# Patient Record
Sex: MALE | URBAN - METROPOLITAN AREA
[De-identification: names, ages, dates, MRNs, and addresses within clinical notes are randomized per-mention and may not be internally consistent; named-entity substitution may affect disease eponyms.]

---

## 2022-07-14 ENCOUNTER — OFFICE VISIT (OUTPATIENT)
Dept: FAMILY MEDICINE CLINIC | Facility: CLINIC | Age: 37
End: 2022-07-14
Payer: COMMERCIAL

## 2022-07-14 VITALS
OXYGEN SATURATION: 97 % | TEMPERATURE: 97 F | HEIGHT: 68.35 IN | RESPIRATION RATE: 16 BRPM | HEART RATE: 57 BPM | DIASTOLIC BLOOD PRESSURE: 68 MMHG | WEIGHT: 179 LBS | BODY MASS INDEX: 26.82 KG/M2 | SYSTOLIC BLOOD PRESSURE: 110 MMHG

## 2022-07-14 DIAGNOSIS — Z00.00 ENCOUNTER FOR ANNUAL PHYSICAL EXAM: Primary | ICD-10-CM

## 2022-07-14 PROCEDURE — 3074F SYST BP LT 130 MM HG: CPT | Performed by: FAMILY MEDICINE

## 2022-07-14 PROCEDURE — 3078F DIAST BP <80 MM HG: CPT | Performed by: FAMILY MEDICINE

## 2022-07-14 PROCEDURE — 3008F BODY MASS INDEX DOCD: CPT | Performed by: FAMILY MEDICINE

## 2022-07-14 PROCEDURE — 99385 PREV VISIT NEW AGE 18-39: CPT | Performed by: FAMILY MEDICINE

## 2024-05-06 ENCOUNTER — OFFICE VISIT (OUTPATIENT)
Dept: FAMILY MEDICINE CLINIC | Facility: CLINIC | Age: 39
End: 2024-05-06
Payer: COMMERCIAL

## 2024-05-06 VITALS
SYSTOLIC BLOOD PRESSURE: 116 MMHG | HEIGHT: 68.35 IN | TEMPERATURE: 97 F | HEART RATE: 57 BPM | BODY MASS INDEX: 25.92 KG/M2 | OXYGEN SATURATION: 99 % | DIASTOLIC BLOOD PRESSURE: 70 MMHG | RESPIRATION RATE: 16 BRPM | WEIGHT: 173 LBS

## 2024-05-06 DIAGNOSIS — Z00.00 ENCOUNTER FOR ANNUAL PHYSICAL EXAM: Primary | ICD-10-CM

## 2024-05-06 DIAGNOSIS — R19.7 DIARRHEA, UNSPECIFIED TYPE: ICD-10-CM

## 2024-05-06 DIAGNOSIS — Z80.0 FAMILY HISTORY OF COLON CANCER: ICD-10-CM

## 2024-05-06 DIAGNOSIS — T78.40XA ALLERGY, INITIAL ENCOUNTER: ICD-10-CM

## 2024-05-06 PROCEDURE — 99213 OFFICE O/P EST LOW 20 MIN: CPT | Performed by: FAMILY MEDICINE

## 2024-05-06 PROCEDURE — 99396 PREV VISIT EST AGE 40-64: CPT | Performed by: FAMILY MEDICINE

## 2024-05-06 PROCEDURE — 3008F BODY MASS INDEX DOCD: CPT | Performed by: FAMILY MEDICINE

## 2024-05-06 PROCEDURE — 3078F DIAST BP <80 MM HG: CPT | Performed by: FAMILY MEDICINE

## 2024-05-06 PROCEDURE — 3074F SYST BP LT 130 MM HG: CPT | Performed by: FAMILY MEDICINE

## 2024-05-06 NOTE — PROGRESS NOTES
/70   Pulse 57   Temp 97.2 °F (36.2 °C) (Temporal)   Resp 16   Ht 5' 8.35\" (1.736 m)   Wt 173 lb (78.5 kg)   SpO2 99%   BMI 26.04 kg/m²  Body mass index is 26.04 kg/m².     Chief Complaint   Patient presents with    Physical       Doug Sun is a 39 year old male who presents for a complete physical exam.   HPI:   Patient been seen here after 1-1/2-year  He is here today for physical  He states that he has been struggling with recurrent diarrheas for some time and has seen gastroenterology who did several test unfortunately could not find the cause of his recurrent diarrhea  There is no significant weight loss or weight gain  Denies any abdominal pain nausea or vomiting    Also been complaining of dull ache above the left orbit, that he noticed for the last 7 to 10 days  Patient had his eyes checked about a year ago  He states that his vision has gone worse on the left  He has not taken any medications        Wt Readings from Last 4 Encounters:   05/06/24 173 lb (78.5 kg)   07/14/22 179 lb (81.2 kg)     Body mass index is 26.04 kg/m².   BP Readings from Last 3 Encounters:   05/06/24 116/70   07/14/22 110/68      No current outpatient medications on file.      Past Medical History:    Allergic rhinitis    Depression      Past Surgical History:   Procedure Laterality Date    Endosc balloon sinuplasty  2014      Family History   Problem Relation Age of Onset    Depression Father     Hypertension Father     Depression Mother       Social History:  Social History     Tobacco Use    Smoking status: Former     Types: Cigarettes    Smokeless tobacco: Never   Vaping Use    Vaping status: Never Used   Substance and Sexual Activity    Alcohol use: Never    Drug use: Yes     Frequency: 7.0 times per week     Types: Cannabis      Exercise: none.  Diet: doesn't watch     REVIEW OF SYSTEMS:   GENERAL HEALTH: feels well otherwise, denies fever  SKIN: denies any unusual skin lesions or rashes  EYES: no visual  complaints or deficits  HEENT: denies nasal congestion, sinus pain or sore throat; hearing loss negative  RESPIRATORY: denies shortness of breath, wheezing or cough  CARDIOVASCULAR: denies chest pain or WORRELL; no palpitations  GI: denies nausea, vomiting, constipation, diarrhea; no rectal bleeding; no heartburn  MUSCULOSKELETAL: no joint complaints upper or lower extremities  NEURO: no sensory or motor complaint  PSYCHE: no symptoms of depression or anxiety  HEMATOLOGY: denies h/o anemia; denies bruising or excessive bleeding  ENDOCRINE: denies excessive thirst or urination; denies unexpected wt gain or wt loss  ALLERGY/IMM.: denies food or seasonal allergies    EXAM:   /70   Pulse 57   Temp 97.2 °F (36.2 °C) (Temporal)   Resp 16   Ht 5' 8.35\" (1.736 m)   Wt 173 lb (78.5 kg)   SpO2 99%   BMI 26.04 kg/m²  Body mass index is 26.04 kg/m².   GENERAL: well developed, well nourished,in no apparent distress  SKIN: no rashes,no suspicious lesions  HEENT: atraumatic, normocephalic,  Ears have mild bilateral serous effusion  EYES:PERRLA, EOMI,conjunctiva are clear  NECK: supple,no adenopathy,no bruits  CHEST: no chest tenderness  LUNGS: clear to auscultation  CARDIO: RRR without murmur  GI: good BS's,no masses, HSM or tenderness  : Deferred  RECTAL:Deferred  MUSCULOSKELETAL: back is not tender,FROM of the back  EXTREMITIES: no cyanosis, clubbing or edema  NEURO: Oriented times three,cranial nerves are intact,motor and sensory are grossly intact    ASSESSMENT AND PLAN:   :Doug was seen today for physical.    Diagnoses and all orders for this visit:    Encounter for annual physical examOmar Corinne is a 39 year old male who presents for a complete physical exam.   Pt's weight is Body mass index is 26.04 kg/m².,   Exercise regularly --- Dietary measures discussed include diet about 1800 calories - Excercise regimen also reviewed as well as long term benefits on overall health.   Recommend at least 30 minutes a day 3-4  times a week of aerobic activity such as brisk walking, cycling, aerobics, or swimming.  Anerobic activities also encouraged for overall toning and strength/endurance building  Fasting labs ordered  Immunizations reviewed  Depression screen completed  Diet and exercise counseling given  Reviewed his family history  -     CBC With Differential With Platelet; Future  -     Comp Metabolic Panel (14); Future  -     Lipid Panel; Future  -     TSH and Free T4; Future    Diarrhea, unspecified type  We discussed the differential diagnosis that includes irritable bowel syndrome but given the family history of colon cancer patient may need a colonoscopy  His previous gastroenterologist has moved quite far from here and patient is preferring somebody new close to where he lives  Referral given to see Dr. Murphy-     Gastro Referral - In Network    Family history of colon cancer  -     Gastro Referral - In Network    Allergy, initial encounter  Advised over-the-counter Claritin once a day for at least 2 to 3 weeks  Tylenol for occasional headache  I also advised him to go back to see optometrist  Maybe his number has changed        The patient indicates understanding of these issues and agrees to the plan.  .      No follow-ups on file.        Kd Aaron MD, 5/6/2024, 12:23 PM      This dictation was performed with a verbal recognition program (DRAGON) and it was checked for errors. It is possible that there are still dictated errors within this office note. If so, please bring any errors to my attention for an addendum. All efforts were made to ensure that this office note is accurate

## 2024-05-10 ENCOUNTER — LAB ENCOUNTER (OUTPATIENT)
Dept: LAB | Age: 39
End: 2024-05-10
Attending: INTERNAL MEDICINE

## 2024-05-10 DIAGNOSIS — K52.9 CHRONIC DIARRHEA: ICD-10-CM

## 2024-05-10 DIAGNOSIS — Z00.00 ENCOUNTER FOR ANNUAL PHYSICAL EXAM: ICD-10-CM

## 2024-05-10 LAB
ALBUMIN SERPL-MCNC: 4.1 G/DL (ref 3.4–5)
ALBUMIN/GLOB SERPL: 1.3 {RATIO} (ref 1–2)
ALP LIVER SERPL-CCNC: 55 U/L
ALT SERPL-CCNC: 21 U/L
ANION GAP SERPL CALC-SCNC: 4 MMOL/L (ref 0–18)
AST SERPL-CCNC: 13 U/L (ref 15–37)
BASOPHILS # BLD AUTO: 0.05 X10(3) UL (ref 0–0.2)
BASOPHILS NFR BLD AUTO: 0.8 %
BILIRUB SERPL-MCNC: 0.5 MG/DL (ref 0.1–2)
BUN BLD-MCNC: 14 MG/DL (ref 9–23)
CALCIUM BLD-MCNC: 9 MG/DL (ref 8.5–10.1)
CHLORIDE SERPL-SCNC: 107 MMOL/L (ref 98–112)
CHOLEST SERPL-MCNC: 176 MG/DL (ref ?–200)
CO2 SERPL-SCNC: 29 MMOL/L (ref 21–32)
CREAT BLD-MCNC: 1.09 MG/DL
EGFRCR SERPLBLD CKD-EPI 2021: 88 ML/MIN/1.73M2 (ref 60–?)
EOSINOPHIL # BLD AUTO: 0.23 X10(3) UL (ref 0–0.7)
EOSINOPHIL NFR BLD AUTO: 3.8 %
ERYTHROCYTE [DISTWIDTH] IN BLOOD BY AUTOMATED COUNT: 13.1 %
FASTING PATIENT LIPID ANSWER: YES
FASTING STATUS PATIENT QL REPORTED: YES
GLOBULIN PLAS-MCNC: 3.1 G/DL (ref 2.8–4.4)
GLUCOSE BLD-MCNC: 89 MG/DL (ref 70–99)
HCT VFR BLD AUTO: 42.8 %
HDLC SERPL-MCNC: 65 MG/DL (ref 40–59)
HGB BLD-MCNC: 15 G/DL
IMM GRANULOCYTES # BLD AUTO: 0.01 X10(3) UL (ref 0–1)
IMM GRANULOCYTES NFR BLD: 0.2 %
LDLC SERPL CALC-MCNC: 104 MG/DL (ref ?–100)
LYMPHOCYTES # BLD AUTO: 1.71 X10(3) UL (ref 1–4)
LYMPHOCYTES NFR BLD AUTO: 28 %
MCH RBC QN AUTO: 29.3 PG (ref 26–34)
MCHC RBC AUTO-ENTMCNC: 35 G/DL (ref 31–37)
MCV RBC AUTO: 83.6 FL
MONOCYTES # BLD AUTO: 0.58 X10(3) UL (ref 0.1–1)
MONOCYTES NFR BLD AUTO: 9.5 %
NEUTROPHILS # BLD AUTO: 3.53 X10 (3) UL (ref 1.5–7.7)
NEUTROPHILS # BLD AUTO: 3.53 X10(3) UL (ref 1.5–7.7)
NEUTROPHILS NFR BLD AUTO: 57.7 %
NONHDLC SERPL-MCNC: 111 MG/DL (ref ?–130)
OSMOLALITY SERPL CALC.SUM OF ELEC: 290 MOSM/KG (ref 275–295)
PLATELET # BLD AUTO: 252 10(3)UL (ref 150–450)
POTASSIUM SERPL-SCNC: 4.1 MMOL/L (ref 3.5–5.1)
PROT SERPL-MCNC: 7.2 G/DL (ref 6.4–8.2)
RBC # BLD AUTO: 5.12 X10(6)UL
SODIUM SERPL-SCNC: 140 MMOL/L (ref 136–145)
T4 FREE SERPL-MCNC: 1.2 NG/DL (ref 0.8–1.7)
TRIGL SERPL-MCNC: 32 MG/DL (ref 30–149)
TSI SER-ACNC: 0.62 MIU/ML (ref 0.36–3.74)
VLDLC SERPL CALC-MCNC: 5 MG/DL (ref 0–30)
WBC # BLD AUTO: 6.1 X10(3) UL (ref 4–11)

## 2024-05-10 PROCEDURE — 85025 COMPLETE CBC W/AUTO DIFF WBC: CPT

## 2024-05-10 PROCEDURE — 36415 COLL VENOUS BLD VENIPUNCTURE: CPT

## 2024-05-10 PROCEDURE — 80053 COMPREHEN METABOLIC PANEL: CPT

## 2024-05-10 PROCEDURE — 82656 EL-1 FECAL QUAL/SEMIQ: CPT

## 2024-05-10 PROCEDURE — 84439 ASSAY OF FREE THYROXINE: CPT

## 2024-05-10 PROCEDURE — 87493 C DIFF AMPLIFIED PROBE: CPT

## 2024-05-10 PROCEDURE — 80061 LIPID PANEL: CPT

## 2024-05-10 PROCEDURE — 84443 ASSAY THYROID STIM HORMONE: CPT

## 2024-05-11 LAB — C DIFF TOX B STL QL: NEGATIVE

## 2024-05-13 NOTE — PROGRESS NOTES
Please notify the patient of normal  Results except for borderline high LDL  Rest of your labs are in acceptable limits  I am waiting for one of the test stool pancreatic elastase results  We will reported separately

## 2024-05-17 LAB — PANCREATIC ELAST FECAL: 396 UG ELAST./G

## 2024-05-30 PROBLEM — K59.1 FUNCTIONAL DIARRHEA: Status: ACTIVE | Noted: 2024-05-30

## 2024-05-30 PROBLEM — R19.7 DIARRHEA, UNSPECIFIED: Status: ACTIVE | Noted: 2024-05-30

## 2025-04-16 ENCOUNTER — OFFICE VISIT (OUTPATIENT)
Dept: FAMILY MEDICINE CLINIC | Facility: CLINIC | Age: 40
End: 2025-04-16
Payer: COMMERCIAL

## 2025-04-16 VITALS
RESPIRATION RATE: 16 BRPM | HEIGHT: 68 IN | WEIGHT: 169.13 LBS | DIASTOLIC BLOOD PRESSURE: 80 MMHG | TEMPERATURE: 98 F | BODY MASS INDEX: 25.63 KG/M2 | OXYGEN SATURATION: 98 % | SYSTOLIC BLOOD PRESSURE: 120 MMHG | HEART RATE: 76 BPM

## 2025-04-16 DIAGNOSIS — J02.9 SORE THROAT: Primary | ICD-10-CM

## 2025-04-16 DIAGNOSIS — Z00.00 BLOOD TESTS FOR ROUTINE GENERAL PHYSICAL EXAMINATION: ICD-10-CM

## 2025-04-16 PROCEDURE — 3079F DIAST BP 80-89 MM HG: CPT | Performed by: FAMILY MEDICINE

## 2025-04-16 PROCEDURE — 99213 OFFICE O/P EST LOW 20 MIN: CPT | Performed by: FAMILY MEDICINE

## 2025-04-16 PROCEDURE — 3008F BODY MASS INDEX DOCD: CPT | Performed by: FAMILY MEDICINE

## 2025-04-16 PROCEDURE — 3074F SYST BP LT 130 MM HG: CPT | Performed by: FAMILY MEDICINE

## 2025-04-16 RX ORDER — FLUTICASONE PROPIONATE 50 MCG
2 SPRAY, SUSPENSION (ML) NASAL DAILY
Qty: 1 EACH | Refills: 0 | Status: SHIPPED | OUTPATIENT
Start: 2025-04-16

## 2025-04-16 RX ORDER — FEXOFENADINE HCL 180 MG/1
180 TABLET ORAL DAILY
Qty: 30 TABLET | Refills: 11 | Status: SHIPPED | OUTPATIENT
Start: 2025-04-16 | End: 2026-04-16

## 2025-04-16 NOTE — PATIENT INSTRUCTIONS
VISIT SUMMARY:    You came in today because of a sore throat that has been bothering you for about a week. We discussed your symptoms and possible causes, and I provided a treatment plan to help you feel better.    YOUR PLAN:    -SORE THROAT WITH POSTNASAL DRIP: Your sore throat is likely due to allergic rhinitis, which is inflammation in your nose caused by allergies. This is not a bacterial infection. To reduce inflammation and mucus, I recommend increasing your fluid intake, doing steam inhalation, and using a sinus rinse or neti pot. I have prescribed Allegra (fexofenadine) to take daily for 2-3 weeks and Flonase (fluticasone) nasal spray. If your mucus is thick, you can take over-the-counter Mucinex (guaifenesin), but stop if your cough gets worse.    -GENERAL HEALTH MAINTENANCE: We discussed your upcoming physical and routine health maintenance. Your previous lab results were normal. You are due for a COVID-19 vaccine, which you can get at any pharmacy if you are not up to date. I have ordered blood tests for your physical, including a complete blood count, chemistry panel, cholesterol, and thyroid function tests.    INSTRUCTIONS:    Please follow the treatment plan for your sore throat and allergies. Make sure to get your COVID-19 vaccine if you haven't already. We will follow up with the results of your blood tests after your physical.

## 2025-04-16 NOTE — PROGRESS NOTES
Subjective:   Doug Sun is a 40 year old male who presents for Sore Throat (The following individual(s) verbally consented to be recorded using ambient AI listening technology and understand that they can each withdraw their consent to this listening technology at any point by asking the clinician to turn off or pause the recording:/Patient name: Doug Sun //  ) and Cough       History/Other:   History of Present Illness  Doug Sun is a 40-year-old male who presents with a sore throat persisting for about a week.    He has experienced a sore throat for approximately one week, which has not improved over time. The pain worsens throughout the day and is present when swallowing. Initially, he thought it might be due to postnasal drip.    He experienced a low-grade fever of about 99°F on Friday, which resolved after taking a day off work and resting. His son had an illness the previous week that lasted about three days, and he is uncertain if his symptoms are related.    He has a cough that produces mucus, although he has not checked the color of the mucus. No sensation of being underwater when moving his head, but he acknowledges the presence of fluid.    He is not currently taking any prescription medications.   Chief Complaint Reviewed and Verified  Nursing Notes Reviewed and   Verified  Tobacco Reviewed  Allergies Reviewed  Medications Reviewed    Problem List Reviewed  Medical History Reviewed  Surgical History   Reviewed  Family History Reviewed         Tobacco:  He smoked tobacco in the past but quit greater than 12 months ago.  Tobacco Use[1]     Current Medications[2]    PHQ-2 SCORE: 0  , done 4/16/2025          Review of Systems:  Pertinent items are noted in HPI.      Objective:   /80   Pulse 76   Temp 97.8 °F (36.6 °C) (Temporal)   Resp 16   Ht 5' 8\" (1.727 m)   Wt 169 lb 2 oz (76.7 kg)   SpO2 98%   BMI 25.72 kg/m²  Estimated body mass index is 25.72 kg/m² as calculated from the  following:    Height as of this encounter: 5' 8\" (1.727 m).    Weight as of this encounter: 169 lb 2 oz (76.7 kg).  Results       Physical Exam  Alert awake oriented x 3  HEENT: Throat with significant swelling.   Uvula swollen.  Bilateral serous effusion in the ears  Chest clear to auscultation  Heart-regular rate rhythm  Abdomen soft nontender nondistended  Extremities: No pedal edema cyanosis or clubbing          Assessment & Plan:   1. Sore throat (Primary)  Sore throat with postnasal drip  Sore throat likely due to allergic rhinitis, not bacterial infection. Discussed allergy treatment to reduce inflammation and mucus, with expected relief in three days.   Risks of untreated allergies include persistent symptoms. Discussed alternatives like neti pot and sinus rinse.  - Advise increased fluid intake.  - Recommend steam inhalation.  - Prescribe Allegra (fexofenadine) daily for 2-3 weeks.  - Prescribe Flonase (fluticasone) nasal spray.  - Suggest over-the-counter Mucinex (guaifenesin) if mucus is thick, discontinue if cough worsens.  - Recommend sinus rinse or neti pot-       Fexofenadine HCl; Take 1 tablet (180 mg total) by mouth daily.  Dispense: 30 tablet; Refill: 11  -     Fluticasone Propionate; 2 sprays by Each Nare route daily.  Dispense: 1 each; Refill: 0  2. Blood tests for routine general physical examination  -     CBC With Differential With Platelet; Future; Expected date: 04/16/2025  -     Comp Metabolic Panel (14); Future; Expected date: 04/16/2025  -     Lipid Panel; Future; Expected date: 04/16/2025  -     TSH and Free T4; Future; Expected date: 04/16/2025    General Health Maintenance  Discussed upcoming physical and routine health maintenance. Previous labs normal. COVID-19 vaccine due, HPV not recommended at this age.  - Order CBC, chemistry, cholesterol, and thyroid labs for physical.  - Advise COVID-19 vaccination at any pharmacy if not up to date.        No follow-ups on file.        Kd  MD Agnieszka, 2025, 9:22 AM              [1]   Social History  Tobacco Use   Smoking Status Former    Current packs/day: 0.00    Average packs/day: 0.3 packs/day for 10.0 years (2.5 ttl pk-yrs)    Types: Cigarettes    Quit date: 2012    Years since quittin.2   Smokeless Tobacco Never   [2]   Current Outpatient Medications   Medication Sig Dispense Refill    fexofenadine (ALLEGRA ALLERGY) 180 MG Oral Tab Take 1 tablet (180 mg total) by mouth daily. 30 tablet 11    fluticasone propionate 50 MCG/ACT Nasal Suspension 2 sprays by Each Nare route daily. 1 each 0    PEG 3350-KCl-Na Bicarb-NaCl 420 g Oral Recon Soln Take as directed by physician 4000 mL 0

## 2025-05-23 ENCOUNTER — OFFICE VISIT (OUTPATIENT)
Facility: LOCATION | Age: 40
End: 2025-05-23
Payer: COMMERCIAL

## 2025-05-23 DIAGNOSIS — K21.9 LARYNGOPHARYNGEAL REFLUX (LPR): Primary | ICD-10-CM

## 2025-05-23 DIAGNOSIS — R13.10 ODYNOPHAGIA: ICD-10-CM

## 2025-05-23 RX ORDER — PANTOPRAZOLE SODIUM 40 MG/1
40 TABLET, DELAYED RELEASE ORAL DAILY
Qty: 30 TABLET | Refills: 3 | Status: SHIPPED | OUTPATIENT
Start: 2025-05-23

## 2025-05-23 NOTE — PROGRESS NOTES
The following individual(s) verbally consented to be recorded using ambient AI listening technology and understand that they can each withdraw their consent to this listening technology at any point by asking the clinician to turn off or pause the recording: yes patient consents     Patient name: Doug Sun

## 2025-05-23 NOTE — PROGRESS NOTES
[facility]  OTOLARYNGOLOGY - HEAD & NECK SURGERY    5/23/2025     Reason for Consultation:     Chief Complaint   Patient presents with    Throat Problem     Patient is here due to recurrent sore throat, reports slight issues swallowing.          History of Present Illness:     History of Present Illness  Doug Sun is a 40 year old male who presents with intermittent sore throat and left-sided throat pain. He was referred by his primary care doctor for evaluation of his throat pain and sinus issues.    He has been experiencing intermittent sore throat with periods of intense pain for several months, initially noticed last year. The pain is localized to the left side of his throat and worsens throughout the day, particularly when swallowing liquids, especially warm ones. He describes the sensation as painful and notes that it is not reproducible to touch, occurring only internally.    He denies consistent acid reflux but has experienced a sour taste and dryness in his mouth upon waking. He has a history of sinus issues and underwent balloon sinuplasty in 2012 due to recurrent sinus infections occurring twice a year. He also reports ringing in his left ear for the past eight to nine years, which was evaluated by previous ENTs who ruled out hearing loss or tumors.    He experiences a random, intermittent dry cough and occasional snoring, as noted by his wife. No symptoms typically associated with allergies, such as itchy eyes or nose, and he does not believe his sore throat is related to postnasal drip, as it worsens throughout the day rather than being bad in the morning. He has not noticed any correlation between his symptoms and acid reflux, although he does eat late at night.    Past Medical History  Past Medical History[1]    Past Surgical History  Past Surgical History[2]    Family History  Family History[3]    Social History  Pediatric History   Patient Parents    Not on file     Other Topics Concern    Caffeine  Concern Not Asked    Exercise Not Asked    Seat Belt Not Asked    Special Diet Not Asked    Stress Concern Not Asked    Weight Concern Not Asked   Social History Narrative    Not on file           Current Medications:  Current Medications[4]    Allergies  Allergies[5]    Review of Systems:     A comprehensive 10 point review of systems was completed.  Pertinent positives and negatives noted in the the HPI.    Physical Exam:     There were no vitals taken for this visit.    GENERAL: No acute distress, Comfortable appearing  FACE: HB 1/6, Normal Animation  HEAD: Normocephalic  EYES: EOMI, pupils equil  EARS: Bilateral Auricles Symmetric, bilateral tympanic membranes are normal  NOSE: Nares patent bilaterally  ORAL CAVITY: Tongue mobile, Oropharynx clear, Floor of mouth clear, Posterior oropharynx normal  NECK: No palpable lymphadenopathy, thyroid not palpable, nontender    PROCEDURE: FLEXIBLE FIBEROPTIC LARYNGOSCOPY (88209)    Due to inability for adequate examination of the larynx and need for magnification to perform the examination, endoscopy was performed.  Risks and benefits were discussed with patient/family and they have given verbal consent to proceed.    Procedure Detail & Findings:     After placement of topical anesthetic intranasally the flexible laryngoscope was inserted into the nare and driven through the nasal cavity with no significant abnormal findings noted in the nasal cavities or nasopharynx. The oropharynx, hypopharynx and larynx were subsequently examined and the following findings were noted:    Base of Tongue: Normal    Valeculla: Normal    Arytenoids: There are small arytenoid ulcerations bilaterally worse on the left    Introitus of the esophagus: Normal    Epiglottis: Normal    False vocal cords: Normal    True vocal cords: Normal    Subglottic space: Normal    Mobility of True vocal cords: Normal    Condition: Stable    Complications: Patient tolerated the procedure well with no immediate  complication.      Results:     Laboratory Data:  Lab Results   Component Value Date    WBC 6.1 05/10/2024    HGB 15.0 05/10/2024    HCT 42.8 05/10/2024    .0 05/10/2024    CREATSERUM 1.09 05/10/2024    BUN 14 05/10/2024     05/10/2024    K 4.1 05/10/2024     05/10/2024    CO2 29.0 05/10/2024    GLU 89 05/10/2024    CA 9.0 05/10/2024    ALB 4.1 05/10/2024    ALKPHO 55 05/10/2024    TP 7.2 05/10/2024    AST 13 (L) 05/10/2024    ALT 21 05/10/2024    T4F 1.2 05/10/2024    TSH 0.618 05/10/2024         Imaging:  No results found.    Impression:       ICD-10-CM    1. Laryngopharyngeal reflux (LPR)  K21.9       2. Odynophagia  R13.10            Recommendations:       Laryngopharyngeal reflux disease  Intermittent left-sided sore throat worsening throughout the day, with dryness and sour taste in the morning. Examination shows laryngeal swelling and ulcers indicative of reflux. Differential includes silent reflux and postnasal drip, with the latter being less likely due to absence of allergy symptoms. Late-night eating may contribute to reflux. Lifestyle modifications and short-term reflux medication are discussed. Short-term use is safe with minimal side effects for 4-6 weeks; long-term use may affect nutrient absorption, but this is not a concern for the short course.  - Prescribe reflux medication for 4-6 weeks.  - Advise lifestyle modifications: avoid eating 2 hours before bedtime, limit caffeine after 12 PM, and reduce intake of spicy foods, alcohol, and chocolate.  - Return to see me if there are persistent issues, may benefit from speech therapy and vocal rest, given vocal overuse can prevent these ulcerations from healing    Temporomandibular joint disorder  Chronic left ear tinnitus and jaw discomfort, likely related to temporomandibular joint disorder. Previous evaluations ruled out hearing loss and tumors. Symptoms have persisted for 8-9 years without acute changes.  -Avoid chewing hard foods  or gum  -May use warm compresses  -Can consider physical therapy if symptoms persist       Thank you for allowing me to participate in the care of your patient.    Uche Martinez,    Otolaryngology/Rhinology, Sinus, and Endoscopic Skull Base Surgery  [facility]   [address] [place] [address] [address]  [place] [address]  Phone [phone]  Fax [phone]  5/23/2025  3:48 PM  5/23/2025          [1]   Past Medical History:   Allergic rhinitis    Bad breath    Depression    Diarrhea, unspecified    Flatulence/gas pain/belching    History of depression    Wears glasses   [2]   Past Surgical History:  Procedure Laterality Date    Colonoscopy  2001    After a unknown intestinal infection contracted in [place]    Endosc balloon sinuplasty  2014   [3]   Family History  Problem Relation Age of Onset    Depression Father     Hypertension Father         around age 50    Heart Attack Father         once around age 60, again around age 70    Depression Mother     Heart Attack Paternal Grandfather         around age 70    Colon Cancer Maternal Uncle         around age 60    Diabetes Maternal Uncle         around age 50   [4]   Current Outpatient Medications   Medication Sig Dispense Refill    pantoprazole 40 MG Oral Tab EC Take 1 tablet (40 mg total) by mouth daily. Use for 6 weeks then stop 30 tablet 3    fexofenadine (ALLEGRA ALLERGY) 180 MG Oral Tab Take 1 tablet (180 mg total) by mouth daily. 30 tablet 11    fluticasone propionate 50 MCG/ACT Nasal Suspension 2 sprays by Each Nare route daily. 1 each 0    PEG 3350-KCl-Na Bicarb-NaCl 420 g Oral Recon Soln Take as directed by physician 4000 mL 0   [5]   Allergies  Allergen Reactions    Sulfa Antibiotics OTHER (SEE COMMENTS)     Numbness